# Patient Record
Sex: MALE | Race: WHITE | NOT HISPANIC OR LATINO | Employment: FULL TIME | URBAN - METROPOLITAN AREA
[De-identification: names, ages, dates, MRNs, and addresses within clinical notes are randomized per-mention and may not be internally consistent; named-entity substitution may affect disease eponyms.]

---

## 2019-09-20 ENCOUNTER — OFFICE VISIT (OUTPATIENT)
Dept: NEUROLOGY | Facility: CLINIC | Age: 29
End: 2019-09-20

## 2019-09-20 VITALS — RESPIRATION RATE: 16 BRPM | WEIGHT: 197 LBS | BODY MASS INDEX: 27.58 KG/M2 | HEIGHT: 71 IN | OXYGEN SATURATION: 98 %

## 2019-09-20 DIAGNOSIS — R55 SYNCOPE AND COLLAPSE: Primary | ICD-10-CM

## 2019-09-20 PROCEDURE — 99204 OFFICE O/P NEW MOD 45 MIN: CPT | Performed by: PSYCHIATRY & NEUROLOGY

## 2019-09-20 RX ORDER — IBUPROFEN 400 MG/1
400 TABLET ORAL EVERY 8 HOURS PRN
COMMUNITY

## 2019-09-20 NOTE — PROGRESS NOTES
"Subjective:    CC: Jericho Roque is seen today in consultation at the request of SHOAIB Ambrose for Vasovagal Symptoms       HPI:  29-year-old male with no significant past medical history presents with a syncopal episode.  As per patient he had an episode while watching his iPad in the middle of August.  The episode started with him feeling nauseous, dizzy and lightheaded.  He initially attributed it to the fact that he was watching a tracheostomy procedure on his iPad which grossed him out and may have caused the symptoms.  After that he got up to go to the kitchen to have water but the nausea only worsened.  He then called out to his brother and when his brother came patient was on the floor.  His eyes were rolled back followed by staring ahead in space.  His jaw and upper body also became very stiff and he had slight twitching of his arms.  Patient states he has partial recollection of the event as he describes it as being in a \"dreamlike state\".  He came to after about 15 seconds on hearing his brother called out his name.  Was extremely tired and not quite himself for almost a day.  Denies any bowel or bladder incontinence.  His PCP who did labs (does not know the results yet).  Patient says he had a similar episode 10 years ago when he had walked out of the house.  His head tilted back at the time and he tensed up and was about to fall backwards when someone caught him.  Again he lost awareness briefly.  He also has symptoms of lightheadedness when he stands up too quickly with darkening of his vision as if closing in.  Also denies having any febrile seizures as a child or family history of seizures.  He has had multiple concussions while playing rugby in school with his worst one being in 2010.  No loss of consciousness with the concussions.  He currently works as a  but does not operate heavy equipment.  Also drinks alcohol occasionally.    The following portions of the patient's " "history were reviewed today and updated as of 09/20/2019  : allergies, current medications, past family history, past medical history, past social history, past surgical history and problem list  These document will be scanned to patient's chart.      Current Outpatient Medications:   •  ibuprofen (ADVIL,MOTRIN) 400 MG tablet, Take 400 mg by mouth Every 8 (Eight) Hours As Needed for Mild Pain ., Disp: , Rfl:    History reviewed. No pertinent past medical history.   History reviewed. No pertinent surgical history.   Family History   Problem Relation Age of Onset   • Migraines Mother    • Alcohol abuse Maternal Uncle       Social History     Socioeconomic History   • Marital status: Single     Spouse name: Not on file   • Number of children: Not on file   • Years of education: Not on file   • Highest education level: Not on file   Tobacco Use   • Smoking status: Current Every Day Smoker     Packs/day: 0.50     Types: Cigarettes   Substance and Sexual Activity   • Alcohol use: Yes     Comment: Occasionally   • Drug use: No   • Sexual activity: Defer     Review of Systems   Neurological: Positive for dizziness and light-headedness.   All other systems reviewed and are negative.      Objective:    Resp 16   Ht 180.3 cm (71\")   Wt 89.4 kg (197 lb)   SpO2 98%   BMI 27.48 kg/m²     Neurology Exam:    General apperance: NAD.  Orthostatics checked today showed an initial rise in blood pressure when going from a laying down position to sitting up however there was a significant drop of 14 mmHg of systolic and 6 mmHg of diastolic blood pressure on standing up with his heart rate going up.    Mental status: Alert, awake and oriented to time place and person.    Recent and Remote memory: Intact.    Attention span and Concentration: Normal.     Language and Speech: Intact- No dysarthria.    Fluency, Naming , Repitition and Comprehension:  Intact    Cranial Nerves:   CN II: Visual fields are full. Intact. Fundi - Normal, No " papillederma, Pupils - KEYANNA  CN III, IV and VI: Extraocular movements are intact. Normal saccades.   CN V: Facial sensation is intact.   CN VII: Muscles of facial expression reveal no asymmetry. Intact.   CN VIII: Hearing is intact. Whispered voice intact.   CN IX and X: Palate elevates symmetrically. Intact  CN XI: Shoulder shrug is intact.   CN XII: Tongue is midline without evidence of atrophy or fasciculation.     Ophthalmoscopic exam of optic disc-normal    Motor:  Right UE muscle strength 5/5. Normal tone.     Left UE muscle strength 5/5. Normal tone.      Right LE muscle strength5/5. Normal tone.     Left LE muscle strength 5/5. Normal tone.      Sensory: Normal light touch, vibration and pinprick sensation bilaterally.    DTRs: 2+ bilaterally in upper and lower extremities.    Babinski: Negative bilaterally.    Co-ordination: Normal finger-to-nose, heel to shin B/L.    Rhomberg: Negative.    Gait: Normal.    Cardiovascular: Regular rate and rhythm without murmur, gallop or rub.    Assessment and Plan:  1. Syncope and collapse  Patient could have had a focal seizure versus a vasovagal syncopal episode  I will get an MRI brain, EEG and tilt table test  I have asked the patient to keep himself extremely well-hydrated, to get adequate sleep at night and to avoid heavy alcohol intake.  I have also counseled him on seizure precautions including no driving for 3 months from this episode and refraining from working with heavy machinery    - MRI Brain Without Contrast; Future  - EEG; Future  - Tilt Table; Future     Of note- MRI BRAIN done at [facility] was normal    Return in about 2 months (around 11/20/2019).         Lianne Dumont MD

## 2019-09-29 ENCOUNTER — APPOINTMENT (OUTPATIENT)
Dept: MRI IMAGING | Facility: HOSPITAL | Age: 29
End: 2019-09-29

## 2019-10-22 ENCOUNTER — TELEPHONE (OUTPATIENT)
Dept: NEUROLOGY | Facility: CLINIC | Age: 29
End: 2019-10-22

## 2019-10-22 NOTE — TELEPHONE ENCOUNTER
----- Message from Alejandralacey Evens sent at 10/21/2019  1:37 PM EDT -----  Contact: [phone]  Xochilt Peguero, with  Outpatient scheduling, left a vm stating she cannot get a hold of pt to reschedule them and asks for a call back.

## 2019-10-31 ENCOUNTER — APPOINTMENT (OUTPATIENT)
Dept: CARDIOLOGY | Facility: HOSPITAL | Age: 29
End: 2019-10-31

## 2019-10-31 ENCOUNTER — APPOINTMENT (OUTPATIENT)
Dept: NEUROLOGY | Facility: HOSPITAL | Age: 29
End: 2019-10-31

## 2019-10-31 ENCOUNTER — HOSPITAL ENCOUNTER (OUTPATIENT)
Dept: NEUROLOGY | Facility: HOSPITAL | Age: 29
Discharge: HOME | End: 2019-10-31
Admitting: PSYCHIATRY & NEUROLOGY

## 2019-10-31 DIAGNOSIS — R55 SYNCOPE AND COLLAPSE: ICD-10-CM

## 2019-10-31 PROCEDURE — 95813 EEG EXTND MNTR 61-119 MIN: CPT

## 2019-10-31 PROCEDURE — 95813 EEG EXTND MNTR 61-119 MIN: CPT | Performed by: PSYCHIATRY & NEUROLOGY

## 2019-11-06 RX ORDER — LEVETIRACETAM 500 MG/1
500 TABLET ORAL 2 TIMES DAILY
Qty: 60 TABLET | Refills: 5 | Status: SHIPPED | OUTPATIENT
Start: 2019-11-06 | End: 2023-02-06

## 2019-11-12 ENCOUNTER — HOSPITAL ENCOUNTER (OUTPATIENT)
Dept: CARDIOLOGY | Facility: HOSPITAL | Age: 29
Discharge: HOME | End: 2019-11-12
Admitting: PSYCHIATRY & NEUROLOGY

## 2019-11-12 DIAGNOSIS — R55 SYNCOPE AND COLLAPSE: ICD-10-CM

## 2019-11-12 LAB — BH CV TILT AGENT USED: NORMAL

## 2019-11-12 PROCEDURE — 93660 TILT TABLE EVALUATION: CPT

## 2019-11-12 PROCEDURE — 93660 TILT TABLE EVALUATION: CPT | Performed by: INTERNAL MEDICINE

## 2019-11-12 RX ADMIN — SODIUM CHLORIDE 1 MCG/MIN: 9 INJECTION, SOLUTION INTRAVENOUS at 14:41

## 2019-11-13 ENCOUNTER — TELEPHONE (OUTPATIENT)
Dept: NEUROLOGY | Facility: CLINIC | Age: 29
End: 2019-11-13

## 2019-11-13 NOTE — TELEPHONE ENCOUNTER
----- Message from Lianne Dumont MD sent at 11/12/2019  5:23 PM EST -----  Can you let him know that his tilt table test was negative and did not show any fluctuations in blood pressure

## 2019-11-13 NOTE — TELEPHONE ENCOUNTER
Pt returned call. Pt was informed that his  tilt table test was negative and did not show any fluctuations in blood pressure. Pt verbally confirmed understanding.

## 2019-11-21 ENCOUNTER — OFFICE VISIT (OUTPATIENT)
Dept: NEUROLOGY | Facility: CLINIC | Age: 29
End: 2019-11-21

## 2019-11-21 VITALS
HEIGHT: 71 IN | DIASTOLIC BLOOD PRESSURE: 70 MMHG | SYSTOLIC BLOOD PRESSURE: 120 MMHG | OXYGEN SATURATION: 98 % | HEART RATE: 89 BPM | BODY MASS INDEX: 28.56 KG/M2 | WEIGHT: 204 LBS

## 2019-11-21 DIAGNOSIS — R56.9 SEIZURE (HCC): Primary | ICD-10-CM

## 2019-11-21 PROCEDURE — 99214 OFFICE O/P EST MOD 30 MIN: CPT | Performed by: PSYCHIATRY & NEUROLOGY
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
## 2023-02-06 ENCOUNTER — OFFICE VISIT (OUTPATIENT)
Dept: FAMILY MEDICINE CLINIC | Facility: CLINIC | Age: 33
End: 2023-02-06
Payer: OTHER MISCELLANEOUS

## 2023-02-06 VITALS
SYSTOLIC BLOOD PRESSURE: 144 MMHG | OXYGEN SATURATION: 97 % | DIASTOLIC BLOOD PRESSURE: 82 MMHG | BODY MASS INDEX: 29.16 KG/M2 | HEART RATE: 69 BPM | HEIGHT: 73 IN | WEIGHT: 220 LBS

## 2023-02-06 DIAGNOSIS — K40.90 RIGHT INGUINAL HERNIA: Primary | ICD-10-CM

## 2023-02-06 PROCEDURE — 99203 OFFICE O/P NEW LOW 30 MIN: CPT | Performed by: INTERNAL MEDICINE

## 2023-02-06 NOTE — PROGRESS NOTES
Office Note     Name: Jericho Roque    : 1990     MRN: [id number]     Chief Complaint  Hernia (Pt is here for a hernia today. Onset 2023. )    Subjective     History of Present Illness:  Jericho Roque is a 32 y.o. male who presents today for hernia.    Works in [place] as a .    Was lifting some pipes, did not seem particularly heavy to strenuous, was moving materials, polishing and cleaning on2023  and noticed some discomfort. Informed his employer at that time. Felt like soreness, and fatigue    Over the week following week started having some some swelling.  The next day went to [facility] in [place], was diagnosed with a hernia and was concerned for incarcerated hernia so he came home and went to  because it was closer to home. The ER physician did not feel any urgent intervention was needed and recommended referral to  general surgery clinic but they cant get him in until March.    Continues to have pain and discomfort, if he rests and lays down all day is present but bearable, if he is active around the house with walking and household activities the pain is more severe.    Has pain radiating from naval to the dany then radiates to the right testicle      Review of Systems:   Review of Systems    Past Medical History:   Past Medical History:   Diagnosis Date   • H/O shoulder surgery    • H/O: knee surgery        Past Surgical History: History reviewed. No pertinent surgical history.    Family History:   Family History   Problem Relation Age of Onset   • Migraines Mother    • Alcohol abuse Maternal Uncle        Social History:   Social History     Socioeconomic History   • Marital status:    Tobacco Use   • Smoking status: Every Day     Types: Cigars   • Smokeless tobacco: Never   Vaping Use   • Vaping Use: Never used   Substance and Sexual Activity   • Alcohol use: Yes     Comment: Occasionally   • Drug use: No   • Sexual  "activity: Defer       Immunizations:   There is no immunization history on file for this patient.     Medications:     Current Outpatient Medications:   •  ibuprofen (ADVIL,MOTRIN) 400 MG tablet, Take 400 mg by mouth Every 8 (Eight) Hours As Needed for Mild Pain ., Disp: , Rfl:     Allergies:   No Known Allergies    Objective     Vital Signs  /82   Pulse 69   Ht 185.4 cm (73\")   Wt 99.8 kg (220 lb)   SpO2 97%   BMI 29.03 kg/m²   Estimated body mass index is 29.03 kg/m² as calculated from the following:    Height as of this encounter: 185.4 cm (73\").    Weight as of this encounter: 99.8 kg (220 lb).          Physical Exam  Vitals and nursing note reviewed. Exam conducted with a chaperone present (Flores Meyer).   Abdominal:      Hernia: A hernia is present. Hernia is present in the right inguinal area.            Procedures     Assessment and Plan     1. Right inguinal hernia  (undiagnosed new problem with uncertain prognosis, will likely require surgery)   Reviewed ER notes from recent visit on 1/24/2023, patient's symptoms are stable since that time but would like sooner evaluation than march which seems reasonable and relevant given his symptosm and risk of incarceration/strangulation.    Continue docusate to avoid constipation/straining to stool. If hernia becomes exquisitely tender, has color change of the skin, vomiting or inability to tolerate PO, ir if decreased/lack of bowel movements or any other symptoms he should be evaluated in ER.      - Ambulatory Referral to General Surgery: Called and scheduled patient with Dr Arriola for next week so that he can have sooner evaluation.       I spent 20 minutes caring for this patient on this date of service. This time includes time spent by me in the following activities:preparing for the visit, reviewing tests, obtaining and/or reviewing a separately obtained history, counseling and educating the patient/family/caregiver and documenting information in " the medical record.    Follow Up  Return if symptoms worsen or fail to improve.    MD ROSEANNA Kang PC [facility]  [address] [address] [phone]

## 2023-08-15 ENCOUNTER — OFFICE VISIT (OUTPATIENT)
Dept: FAMILY MEDICINE CLINIC | Facility: CLINIC | Age: 33
End: 2023-08-15
Payer: OTHER MISCELLANEOUS

## 2023-08-15 VITALS
OXYGEN SATURATION: 98 % | SYSTOLIC BLOOD PRESSURE: 118 MMHG | WEIGHT: 200.5 LBS | DIASTOLIC BLOOD PRESSURE: 78 MMHG | HEART RATE: 70 BPM | BODY MASS INDEX: 27.19 KG/M2

## 2023-08-15 DIAGNOSIS — R20.2 PARESTHESIA OF RIGHT LEG: ICD-10-CM

## 2023-08-15 DIAGNOSIS — M62.561 ATROPHY OF CALF MUSCLES ON RIGHT: ICD-10-CM
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
## 2023-08-15 DIAGNOSIS — M54.6 ACUTE MIDLINE THORACIC BACK PAIN: ICD-10-CM

## 2023-08-15 DIAGNOSIS — M54.41 RIGHT-SIDED LOW BACK PAIN WITH RIGHT-SIDED SCIATICA, UNSPECIFIED CHRONICITY: Primary | ICD-10-CM

## 2023-08-15 DIAGNOSIS — M54.50 TENDERNESS OF LUMBAR REGION: ICD-10-CM

## 2023-08-15 PROCEDURE — 99213 OFFICE O/P EST LOW 20 MIN: CPT | Performed by: INTERNAL MEDICINE
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
## 2023-08-15 NOTE — LETTER
August 15, 2023     Patient: Jericho Roque   YOB: 1990   Date of Visit: 8/15/2023       To Whom It May Concern:    It is my medical opinion that Jericho Roque may return to light duty immediately with the following restrictions: No lifting >5 pounds, no squatting or bending more than 10 minutes out of each shift .          Please excuse patient from work or  duties that require those activities         Sincerely,        Lynne Joya MD    CC:   No Recipients

## 2023-09-25 ENCOUNTER — TELEPHONE (OUTPATIENT)
Dept: FAMILY MEDICINE CLINIC | Facility: CLINIC | Age: 33
End: 2023-09-25

## 2023-09-25 NOTE — TELEPHONE ENCOUNTER
Caller: Jericho Roque    Relationship: Self    Best call back number: [phone]     What was the call regarding:   PATIENT WOULD LIKE A CALL BACK REGARDING THE STATUS OF GETTING HIM SCHEDULED FOR THE MRI SINCE HE WAS INFORMED THAT THE NEEDS A PEER TO PEER AND PATIENT WOULD LIKE TO HAVE THIS DONE AS SOON AS POSSIBLE DUE TO STILL BEING IN PAIN

## 2023-11-06 NOTE — TELEPHONE ENCOUNTER
Caller: Jericho Roque    Relationship: Self    Best call back number: [phone]     HE WOULD LIKE AN UPDATE ON THE PEER TO PEER FOR HIS MRI. THIS IS STARTING TO EFFECT HIS JOB AND THEY ARE RUNNING OUT OF LIGHT DUTY FOR HIM TO DO.    PLEASE CALL WITH AN UPDATE

## 2023-11-08 DIAGNOSIS — M54.41 RIGHT-SIDED LOW BACK PAIN WITH RIGHT-SIDED SCIATICA, UNSPECIFIED CHRONICITY: Primary | ICD-10-CM

## 2023-11-08 DIAGNOSIS — M54.6 ACUTE MIDLINE THORACIC BACK PAIN: ICD-10-CM

## 2023-11-08 DIAGNOSIS — R20.2 PARESTHESIA OF RIGHT LEG: ICD-10-CM
